# Patient Record
Sex: FEMALE | Race: WHITE | Employment: OTHER | URBAN - METROPOLITAN AREA
[De-identification: names, ages, dates, MRNs, and addresses within clinical notes are randomized per-mention and may not be internally consistent; named-entity substitution may affect disease eponyms.]

---

## 2018-02-26 ENCOUNTER — TRANSFERRED RECORDS (OUTPATIENT)
Dept: HEALTH INFORMATION MANAGEMENT | Facility: CLINIC | Age: 68
End: 2018-02-26

## 2018-02-26 LAB
ALT SERPL-CCNC: 22 IU/L (ref 0–32)
AST SERPL-CCNC: 21 IU/L (ref 0–40)
CREAT SERPL-MCNC: 0.86 MG/DL (ref 0.57–1)
GFR SERPL CREATININE-BSD FRML MDRD: 71 ML/MIN/1.73M2
GLUCOSE SERPL-MCNC: 106 MG/DL (ref 65–99)
POTASSIUM SERPL-SCNC: 4.3 MMOL/L (ref 3.5–5.2)
TSH SERPL-ACNC: 2.38 UIU/ML (ref 0.45–4.5)

## 2018-03-14 ENCOUNTER — TRANSFERRED RECORDS (OUTPATIENT)
Dept: HEALTH INFORMATION MANAGEMENT | Facility: CLINIC | Age: 68
End: 2018-03-14

## 2018-04-14 ENCOUNTER — HEALTH MAINTENANCE LETTER (OUTPATIENT)
Age: 68
End: 2018-04-14

## 2018-06-05 ENCOUNTER — TRANSFERRED RECORDS (OUTPATIENT)
Dept: HEALTH INFORMATION MANAGEMENT | Facility: CLINIC | Age: 68
End: 2018-06-05

## 2018-07-10 ENCOUNTER — ANCILLARY ORDERS (OUTPATIENT)
Dept: FAMILY MEDICINE | Facility: CLINIC | Age: 68
End: 2018-07-10
Payer: COMMERCIAL

## 2018-07-10 ENCOUNTER — OFFICE VISIT (OUTPATIENT)
Dept: FAMILY MEDICINE | Facility: CLINIC | Age: 68
End: 2018-07-10
Payer: COMMERCIAL

## 2018-07-10 VITALS
SYSTOLIC BLOOD PRESSURE: 124 MMHG | HEART RATE: 73 BPM | RESPIRATION RATE: 20 BRPM | BODY MASS INDEX: 28.99 KG/M2 | DIASTOLIC BLOOD PRESSURE: 82 MMHG | TEMPERATURE: 98.2 F | HEIGHT: 65 IN | OXYGEN SATURATION: 97 % | WEIGHT: 174 LBS

## 2018-07-10 DIAGNOSIS — G35 MS (MULTIPLE SCLEROSIS) (H): ICD-10-CM

## 2018-07-10 DIAGNOSIS — Z23 NEED FOR VACCINATION: ICD-10-CM

## 2018-07-10 DIAGNOSIS — Z00.00 MEDICARE ANNUAL WELLNESS VISIT, SUBSEQUENT: Primary | ICD-10-CM

## 2018-07-10 DIAGNOSIS — E55.9 VITAMIN D DEFICIENCY: ICD-10-CM

## 2018-07-10 DIAGNOSIS — Z12.31 VISIT FOR SCREENING MAMMOGRAM: ICD-10-CM

## 2018-07-10 LAB — DEPRECATED CALCIDIOL+CALCIFEROL SERPL-MC: 73 UG/L (ref 20–75)

## 2018-07-10 PROCEDURE — 82306 VITAMIN D 25 HYDROXY: CPT | Performed by: FAMILY MEDICINE

## 2018-07-10 PROCEDURE — 90715 TDAP VACCINE 7 YRS/> IM: CPT | Performed by: FAMILY MEDICINE

## 2018-07-10 PROCEDURE — 90471 IMMUNIZATION ADMIN: CPT | Performed by: FAMILY MEDICINE

## 2018-07-10 PROCEDURE — 36415 COLL VENOUS BLD VENIPUNCTURE: CPT | Performed by: FAMILY MEDICINE

## 2018-07-10 PROCEDURE — G0439 PPPS, SUBSEQ VISIT: HCPCS | Mod: 25 | Performed by: FAMILY MEDICINE

## 2018-07-10 NOTE — PROGRESS NOTES
SUBJECTIVE:   Erlinda Massey is a 67 year old female who presents for Preventive Visit.    Are you in the first 12 months of your Medicare Part B coverage?  No    Healthy Habits:    Do you get at least three servings of calcium containing foods daily (dairy, green leafy vegetables, etc.)? yes    Amount of exercise or daily activities, outside of work: walking and stretches in morning    Problems taking medications regularly No    Medication side effects: No    Have you had an eye exam in the past two years? yes    Do you see a dentist twice per year? yes    Do you have sleep apnea, excessive snoring or daytime drowsiness? Yes- snoring and fatigue      Ability to successfully perform activities of daily living: No, needs assistance with: housework    Home safety:  none identified     Hearing impairment: No    Fall risk:  Fallen 2 or more times in the past year?: Yes  Any fall with injury in the past year?: No    COGNITIVE SCREEN  1) Repeat 3 items (Leader, Season, Table)    2) Clock draw: NORMAL  3) 3 item recall: Recalls 3 objects  Results: NORMAL clock, 1-2 items recalled: COGNITIVE IMPAIRMENT LESS LIKELY    Mini-CogTM Copyright SHAHRIAR Freire. Licensed by the author for use in [facility]; reprinted with permission (harish@[facility]). All rights reserved.            PROBLEMS TO ADD ON...  Multiple sclerosis: This is remained relatively stable and she is able to walk and get around pretty well.  She does have a foot drop and a neurologist she found in [place] is helping her treat this with some type of muscle stimulator.    Vitamin D deficiency: Both her neurologist in [place] and the one in [place] feel that getting the vitamin D level in the upper end of the normal range can be helpful for multiple sclerosis so she would like to check her level again and see where she is    Reviewed and updated as needed this visit by clinical staff  Tobacco  Allergies  Meds  Med Hx  Surg Hx  Fam Hx  Soc Hx     "    Reviewed and updated as needed this visit by Provider        Social History   Substance Use Topics     Smoking status: Never Smoker     Smokeless tobacco: Never Used     Alcohol use Yes      Comment: occasionally       If you drink alcohol do you typically have >3 drinks per day or >7 drinks per week? Not Applicable                        Today's PHQ-2 Score:   PHQ-2 ( 1999 Pfizer) 7/10/2018 6/13/2016   Q1: Little interest or pleasure in doing things 0 0   Q2: Feeling down, depressed or hopeless 0 0   PHQ-2 Score 0 0       Do you feel safe in your environment - Yes    Do you have a Health Care Directive?: No:     Current providers sharing in care for this patient include:   Patient Care Team:  SANAZ Almodovar MD as PCP - General    The following health maintenance items are reviewed in Epic and correct as of today:  Health Maintenance   Topic Date Due     HEPATITIS C SCREENING  07/28/1969     TETANUS IMMUNIZATION (SYSTEM ASSIGNED)  07/19/2006     FALL RISK ASSESSMENT  07/28/2016     DEXA SCAN SCREENING (SYSTEM ASSIGNED)  07/28/2016     PNEUMOCOCCAL (1 of 2 - PCV13) 07/28/2016     PHQ-2 Q1 YR  06/13/2017     ADVANCE DIRECTIVE PLANNING Q5 YRS  11/19/2017     PAP Q3 YR  02/09/2018     MAMMO SCREEN Q2 YR (SYSTEM ASSIGNED)  06/13/2018     INFLUENZA VACCINE (1) 09/01/2018     LIPID SCREEN Q5 YR FEMALE (SYSTEM ASSIGNED)  02/09/2020     COLON CANCER SCREEN (SYSTEM ASSIGNED)  11/19/2022         Pneumonia Vaccine: Patient declines  Mammogram Screening: Patient over age 50, mutual decision to screen reflected in health maintenance.    ROS:  Constitutional, HEENT, cardiovascular, pulmonary, gi and gu systems are negative, except as otherwise noted.    OBJECTIVE:   LMP 05/14/2007 Estimated body mass index is 28.96 kg/(m^2) as calculated from the following:    Height as of this encounter: 5' 5\" (1.651 m).    Weight as of this encounter: 174 lb (78.9 kg).  EXAM:   GENERAL APPEARANCE: healthy, alert and no distress  EYES: Eyes " grossly normal to inspection, PERRL and conjunctivae and sclerae normal  HENT: ear canals and TM's normal, nose and mouth without ulcers or lesions, oropharynx clear and oral mucous membranes moist  NECK: no adenopathy, no asymmetry, masses, or scars and thyroid normal to palpation  RESP: lungs clear to auscultation - no rales, rhonchi or wheezes  BREAST: normal without masses, tenderness or nipple discharge and no palpable axillary masses or adenopathy  CV: regular rate and rhythm, normal S1 S2, no S3 or S4, no murmur, click or rub, no peripheral edema and peripheral pulses strong  ABDOMEN: soft, nontender, no hepatosplenomegaly, no masses and bowel sounds normal  MS: no musculoskeletal defects are noted and gait is age appropriate without ataxia  SKIN: no suspicious lesions or rashes  NEURO: Foot drop on the left, otherwise normal muscle tone and strength, sensory exam grossly normal, mentation intact and speech normal  PSYCH: mentation appears normal and affect normal/bright    Diagnostic Test Results:  She brought in copies of labs done in [place] and these will be scanned into the EMR; basically all normal  She also had an updated MRI of the brain which shows demyelination consistent with her multiple sclerosis but no other abnormalities    ASSESSMENT / PLAN:     ASSESSMENT:  1. Medicare annual wellness visit, subsequent    2. Vitamin D deficiency    3. MS (multiple sclerosis) (H)    4. Need for vaccination        PLAN:  Orders Placed This Encounter     DX Hip/Pelvis/Spine     ADMIN: Vaccine, Initial (17534)     TDAP VACCINE (ADACEL) [95834.002]     Vitamin D Deficiency       Patient Instructions     Preventive Health Recommendations    Female Ages 65 +    Yearly exam:     See your health care provider every year in order to  o Review health changes.   o Discuss preventive care.    o Review your medicines if your doctor has prescribed any.      You no longer need a yearly Pap test unless you've had an abnormal  Pap test in the past 10 years. If you have vaginal symptoms, such as bleeding or discharge, be sure to talk with your provider about a Pap test.      Every 1 to 2 years, have a mammogram.  If you are over 69, talk with your health care provider about whether or not you want to continue having screening mammograms.      Every 10 years, have a colonoscopy. Or, have a yearly FIT test (stool test). These exams will check for colon cancer.       Have a cholesterol test every 5 years, or more often if your doctor advises it.       Have a diabetes test (fasting glucose) every three years. If you are at risk for diabetes, you should have this test more often.       At age 65, have a bone density scan (DEXA) to check for osteoporosis (brittle bone disease).    Shots:    Get a flu shot each year.    Get a tetanus shot every 10 years.    Talk to your doctor about your pneumonia vaccines. There are now two you should receive - Pneumovax (PPSV 23) and Prevnar (PCV 13).    Talk to your pharmacist about the shingles vaccine.    Talk to your doctor about the hepatitis B vaccine.    Nutrition:     Eat at least 5 servings of fruits and vegetables each day.      Eat whole-grain bread, whole-wheat pasta and brown rice instead of white grains and rice.      Get adequate Calcium and Vitamin D.     Lifestyle    Exercise at least 150 minutes a week (30 minutes a day, 5 days a week). This will help you control your weight and prevent disease.      Limit alcohol to one drink per day.      No smoking.       Wear sunscreen to prevent skin cancer.       See your dentist twice a year for an exam and cleaning.      See your eye doctor every 1 to 2 years to screen for conditions such as glaucoma, macular degeneration and cataracts.       End of Life Planning:  Patient currently has an advanced directive: No.   COUNSELING:  Reviewed preventive health counseling, as reflected in patient instructions       Regular exercise       Healthy  "diet/nutrition       Vision screening       Hearing screening       Dental care       Immunizations    Declined: Pneumococcal due to Concerns about side effects/safety      Did accept the Adacel vaccine    BP Readings from Last 1 Encounters:   06/13/16 122/70     Estimated body mass index is 28.29 kg/(m^2) as calculated from the following:    Height as of 6/13/16: 5' 5\" (1.651 m).    Weight as of 6/13/16: 170 lb (77.1 kg).           reports that she has never smoked. She has never used smokeless tobacco.      Appropriate preventive services were discussed with this patient, including applicable screening as appropriate for cardiovascular disease, diabetes, osteopenia/osteoporosis, and glaucoma.  As appropriate for age/gender, discussed screening for colorectal cancer, prostate cancer, breast cancer, and cervical cancer. Checklist reviewing preventive services available has been given to the patient.    Reviewed patients plan of care and provided an AVS. The Basic Care Plan (routine screening as documented in Health Maintenance) for Erlinda meets the Care Plan requirement. This Care Plan has been established and reviewed with the Patient.    Counseling Resources:  ATP IV Guidelines  Pooled Cohorts Equation Calculator  Breast Cancer Risk Calculator  FRAX Risk Assessment  ICSI Preventive Guidelines  Dietary Guidelines for Americans, 2010  USDA's MyPlate  ASA Prophylaxis  Lung CA Screening    SANAZ Almodovar MD  [facility]  "

## 2018-07-10 NOTE — MR AVS SNAPSHOT
After Visit Summary   7/10/2018    Erlinda Massey    MRN: [id number]           Patient Information     Date Of Birth          1950        Visit Information        Provider Department      7/10/2018 10:00 AM Nishi, SANAZ Wilkins MD [facility]        Today's Diagnoses     Medicare annual wellness visit, subsequent    -  1    Vitamin D deficiency        MS (multiple sclerosis) (H)          Care Instructions      Preventive Health Recommendations    Female Ages 65 +    Yearly exam:     See your health care provider every year in order to  o Review health changes.   o Discuss preventive care.    o Review your medicines if your doctor has prescribed any.      You no longer need a yearly Pap test unless you've had an abnormal Pap test in the past 10 years. If you have vaginal symptoms, such as bleeding or discharge, be sure to talk with your provider about a Pap test.      Every 1 to 2 years, have a mammogram.  If you are over 69, talk with your health care provider about whether or not you want to continue having screening mammograms.      Every 10 years, have a colonoscopy. Or, have a yearly FIT test (stool test). These exams will check for colon cancer.       Have a cholesterol test every 5 years, or more often if your doctor advises it.       Have a diabetes test (fasting glucose) every three years. If you are at risk for diabetes, you should have this test more often.       At age 65, have a bone density scan (DEXA) to check for osteoporosis (brittle bone disease).    Shots:    Get a flu shot each year.    Get a tetanus shot every 10 years.    Talk to your doctor about your pneumonia vaccines. There are now two you should receive - Pneumovax (PPSV 23) and Prevnar (PCV 13).    Talk to your pharmacist about the shingles vaccine.    Talk to your doctor about the hepatitis B vaccine.    Nutrition:     Eat at least 5 servings of fruits and vegetables each day.      Eat whole-grain bread,  whole-wheat pasta and brown rice instead of white grains and rice.      Get adequate Calcium and Vitamin D.     Lifestyle    Exercise at least 150 minutes a week (30 minutes a day, 5 days a week). This will help you control your weight and prevent disease.      Limit alcohol to one drink per day.      No smoking.       Wear sunscreen to prevent skin cancer.       See your dentist twice a year for an exam and cleaning.      See your eye doctor every 1 to 2 years to screen for conditions such as glaucoma, macular degeneration and cataracts.          Follow-ups after your visit        Your next 10 appointments already scheduled     Jul 11, 2018  9:45 AM CDT   (Arrive by 9:30 AM)   MA SCREENING DIGITAL BILATERAL with [facility]   [facility]    [address] [place] [id number] [phone]           Do not use any powder, lotion or deodorant under your arms or on your breast. If you do, we will ask you to remove it before your exam.  Wear comfortable, two-piece clothing.  If you have any allergies, tell your care team.  Bring any previous mammograms from other facilities or have them mailed to the breast center.              Future tests that were ordered for you today     Open Future Orders        Priority Expected Expires Ordered    DX Hip/Pelvis/Spine Routine  7/10/2019 7/10/2018    MA Screening Digital Bilateral Routine  7/10/2019 7/10/2018            Who to contact     If you have questions or need follow up information about today's clinic visit or your schedule please contact [facility] directly at [phone].  Normal or non-critical lab and imaging results will be communicated to you by MyChart, letter or phone within 4 business days after the clinic has received the results. If you do not hear from us within 7 days, please contact the clinic through MyChart or phone. If you have a critical or abnormal lab result, we will notify  "you by phone as soon as possible.  Submit refill requests through [employer] or call your pharmacy and they will forward the refill request to us. Please allow 3 business days for your refill to be completed.          Additional Information About Your Visit        [employer] Information     [employer] lets you send messages to your doctor, view your test results, renew your prescriptions, schedule appointments and more. To sign up, go to [email or web address] . Click on \"Log in\" on the left side of the screen, which will take you to the Welcome page. Then click on \"Sign up Now\" on the right side of the page.     You will be asked to enter the access code listed below, as well as some personal information. Please follow the directions to create your username and password.     Your access code is: [email or web address]  Expires: 10/8/2018 10:48 AM     Your access code will  in 90 days. If you need help or a new code, please call your [place] clinic or [phone].        Care EveryWhere ID     This is your Care EveryWhere ID. This could be used by other organizations to access your [place] medical records  [id number]        Your Vitals Were     Pulse Temperature Respirations Height Last Period Pulse Oximetry    73 98.2  F (36.8  C) (Oral) 20 5' 5\" (1.651 m) 2007 97%    BMI (Body Mass Index)                   28.96 kg/m2            Blood Pressure from Last 3 Encounters:   07/10/18 124/82   16 122/70   02/09/15 131/85    Weight from Last 3 Encounters:   07/10/18 174 lb (78.9 kg)   16 170 lb (77.1 kg)   02/09/15 162 lb 12.8 oz (73.8 kg)              We Performed the Following     Vitamin D Deficiency        Primary Care Provider Office Phone # Fax #    R Franky Almodovar [phone] [address] [facility] [address]        Equal Access to Services     KIANNA MACHUCA AH: Nereyda De León, bruno jimenes, vish goodman'aan " ah. So [facility] [phone].    ATENCIÓN: Si habla jonna, tiene a tomlinson disposición servicios gratuitos de asistencia lingüística. Jes al [phone].    We comply with applicable federal civil rights laws and [place] laws. We do not discriminate on the basis of race, color, national origin, age, disability, sex, sexual orientation, or gender identity.            Thank you!     Thank you for choosing [facility]  for your care. Our goal is always to provide you with excellent care. Hearing back from our patients is one way we can continue to improve our services. Please take a few minutes to complete the written survey that you may receive in the mail after your visit with us. Thank you!             Your Updated Medication List - Protect others around you: Learn how to safely use, store and throw away your medicines at www.disposemymeds.org.          This list is accurate as of 7/10/18 10:48 AM.  Always use your most recent med list.                   Brand Name Dispense Instructions for use Diagnosis    NO ACTIVE MEDICATIONS      .        order for DME     1 each    Equipment being ordered: AFO for Right Foot Drop    MS (multiple sclerosis) (H), Right foot drop       vitamin D 2000 units tablet     100 tablet    5, 000 units daily    Vitamin D deficiency

## 2018-07-11 ENCOUNTER — RADIANT APPOINTMENT (OUTPATIENT)
Dept: MAMMOGRAPHY | Facility: CLINIC | Age: 68
End: 2018-07-11
Attending: FAMILY MEDICINE
Payer: COMMERCIAL

## 2018-07-11 DIAGNOSIS — Z12.31 VISIT FOR SCREENING MAMMOGRAM: ICD-10-CM

## 2018-07-11 PROCEDURE — 77067 SCR MAMMO BI INCL CAD: CPT | Mod: TC

## 2018-07-12 NOTE — PROGRESS NOTES
Please CALL PATIENT    The vit D level is 73, so I would continue the same dose of Vit D supplement

## 2019-07-24 ENCOUNTER — TELEPHONE (OUTPATIENT)
Dept: FAMILY MEDICINE | Facility: CLINIC | Age: 69
End: 2019-07-24

## 2019-07-24 NOTE — TELEPHONE ENCOUNTER
Hailee Villaseñor APRN CNP  P Cl Provider Careteam Pool             Contact patient to have her establish care with new PCP and discuss DEXA scan.   TANIA Ruby    Previous Messages

## 2019-08-21 ENCOUNTER — OFFICE VISIT (OUTPATIENT)
Dept: FAMILY MEDICINE | Facility: CLINIC | Age: 69
End: 2019-08-21
Payer: COMMERCIAL

## 2019-08-21 VITALS
BODY MASS INDEX: 29.49 KG/M2 | HEART RATE: 70 BPM | OXYGEN SATURATION: 98 % | TEMPERATURE: 97.7 F | WEIGHT: 177 LBS | HEIGHT: 65 IN | DIASTOLIC BLOOD PRESSURE: 74 MMHG | RESPIRATION RATE: 18 BRPM | SYSTOLIC BLOOD PRESSURE: 126 MMHG

## 2019-08-21 DIAGNOSIS — Z00.00 MEDICARE ANNUAL WELLNESS VISIT, SUBSEQUENT: Primary | ICD-10-CM

## 2019-08-21 DIAGNOSIS — E55.9 VITAMIN D DEFICIENCY: ICD-10-CM

## 2019-08-21 DIAGNOSIS — G35 MS (MULTIPLE SCLEROSIS) (H): ICD-10-CM

## 2019-08-21 DIAGNOSIS — E28.39 ESTROGEN DEFICIENCY: ICD-10-CM

## 2019-08-21 DIAGNOSIS — E78.5 HYPERLIPIDEMIA, UNSPECIFIED HYPERLIPIDEMIA TYPE: ICD-10-CM

## 2019-08-21 LAB
ANION GAP SERPL CALCULATED.3IONS-SCNC: 5 MMOL/L (ref 3–14)
BUN SERPL-MCNC: 18 MG/DL (ref 7–30)
CALCIUM SERPL-MCNC: 9.2 MG/DL (ref 8.5–10.1)
CHLORIDE SERPL-SCNC: 105 MMOL/L (ref 94–109)
CHOLEST SERPL-MCNC: 309 MG/DL
CO2 SERPL-SCNC: 28 MMOL/L (ref 20–32)
CREAT SERPL-MCNC: 0.82 MG/DL (ref 0.52–1.04)
GFR SERPL CREATININE-BSD FRML MDRD: 74 ML/MIN/{1.73_M2}
GLUCOSE SERPL-MCNC: 95 MG/DL (ref 70–99)
HDLC SERPL-MCNC: 56 MG/DL
LDLC SERPL CALC-MCNC: 199 MG/DL
NONHDLC SERPL-MCNC: 253 MG/DL
POTASSIUM SERPL-SCNC: 4.3 MMOL/L (ref 3.4–5.3)
SODIUM SERPL-SCNC: 138 MMOL/L (ref 133–144)
TRIGL SERPL-MCNC: 271 MG/DL

## 2019-08-21 PROCEDURE — G0438 PPPS, INITIAL VISIT: HCPCS | Performed by: FAMILY MEDICINE

## 2019-08-21 PROCEDURE — 99213 OFFICE O/P EST LOW 20 MIN: CPT | Mod: 25 | Performed by: FAMILY MEDICINE

## 2019-08-21 PROCEDURE — 36415 COLL VENOUS BLD VENIPUNCTURE: CPT | Performed by: FAMILY MEDICINE

## 2019-08-21 PROCEDURE — 80048 BASIC METABOLIC PNL TOTAL CA: CPT | Performed by: FAMILY MEDICINE

## 2019-08-21 PROCEDURE — 82306 VITAMIN D 25 HYDROXY: CPT | Performed by: FAMILY MEDICINE

## 2019-08-21 PROCEDURE — 80061 LIPID PANEL: CPT | Performed by: FAMILY MEDICINE

## 2019-08-21 RX ORDER — FERROUS GLUCONATE 324(38)MG
324 TABLET ORAL
COMMUNITY
Start: 2019-08-21

## 2019-08-21 ASSESSMENT — MIFFLIN-ST. JEOR: SCORE: 1333.75

## 2019-08-21 ASSESSMENT — PAIN SCALES - GENERAL: PAINLEVEL: NO PAIN (0)

## 2019-08-21 NOTE — PATIENT INSTRUCTIONS
Health Maintenance reviewed and plan for update discussed.  DEXA scan - bone density      Our Clinic hours are:  Mondays    7:20 am - 7 pm  Tues -  Fri  7:20 am - 5 pm    Clinic Phone: [phone]    The clinic lab opens at 7:30 am Mon - Fri and appointments are required.    [facility]  Ph. [phone]  Monday  8 am - 7pm  Tues - Fri 8 am - 5:30 pm

## 2019-08-21 NOTE — LETTER
[facility]  [address] [address] [address]  Phone: [phone]      8/21/2019     Erlinda Massey  [address] [address] [address]      Dear Erlinda:    Thank you for allowing me to participate in your care. Your recent test results were reviewed and listed below.        Cholesterol is very elevated with total cholesterol >300 and LDL or bad cholesterol of 199.     Medications are recommended but I understand your  decision to avoid medications.     The 10-year ASCVD risk score (Leah ALANIS Jr., et al., 2013) is: 8.8%     Values used to calculate the score:       Age: 69 years       Sex: Female       Is Non- : No       Diabetic: No       Tobacco smoker: No       Systolic Blood Pressure: 126 mmHg       Is BP treated: No       HDL Cholesterol: 56 mg/dL       Total Cholesterol: 309 mg/dL   Your results are provided below for your review  Results for orders placed or performed in visit on 08/21/19   Lipid panel reflex to direct LDL Non-fasting   Result Value Ref Range    Cholesterol 309 (H) <200 mg/dL    Triglycerides 271 (H) <150 mg/dL    HDL Cholesterol 56 >49 mg/dL    LDL Cholesterol Calculated 199 (H) <100 mg/dL    Non HDL Cholesterol 253 (H) <130 mg/dL   Basic metabolic panel   Result Value Ref Range    Sodium 138 133 - 144 mmol/L    Potassium 4.3 3.4 - 5.3 mmol/L    Chloride 105 94 - 109 mmol/L    Carbon Dioxide 28 20 - 32 mmol/L    Anion Gap 5 3 - 14 mmol/L    Glucose 95 70 - 99 mg/dL    Urea Nitrogen 18 7 - 30 mg/dL    Creatinine 0.82 0.52 - 1.04 mg/dL    GFR Estimate 74 >60 mL/min/[1.73_m2]    GFR Estimate If Black 85 >60 mL/min/[1.73_m2]    Calcium 9.2 8.5 - 10.1 mg/dL                 Thank you for choosing [place]. As a result, please continue with the treatment plan discussed in the office. Return as discussed or sooner if symptoms worsen or fail to improve.     If you have any further questions or concerns, please do not hesitate to contact  us.      Sincerely,        Dr. Margarita Martinez/[facility]

## 2019-08-21 NOTE — PROGRESS NOTES
"Subjective     Elrinda Massey is a 69 year old female who presents to clinic today for the following health issues:    HPI   New Patient/Transfer of Care - formerly Dr. Almodovar patient  Annual Wellness Visit    Are you in the first 12 months of your Medicare Part B coverage?  No    Physical Health:    In general, how would you rate your overall physical health? excellent    If you drink alcohol do you typically have >3 drinks per day or >7 drinks per week? No    Do you usually eat at least 4 servings of fruit and vegetables a day, include whole grains & fiber and avoid regularly eating high fat or \"junk\" foods? YES    Needs assistance for the following daily activities: housework    Which of the following safety concerns are present in your home?  none identified     Hearing impairment: No    In the past 6 months, have you been bothered by leaking of urine? yes    Mental Health:    In general, how would you rate your overall mental or emotional health? excellent  PHQ-2 Score: 0    Do you feel safe in your environment? Yes    Do you have a Health Care Directive? Yes: Advance Directive has been received and scanned.    Fall risk:       Cognitive Screenin) Repeat 3 items (Leader, Season, Table)    2) Clock draw: NORMAL  3) 3 item recall: Recalls 3 objects  Results: 3 items recalled: COGNITIVE IMPAIRMENT LESS LIKELY    Mini-CogTM Copyright S Mouna. Licensed by the author for use in [facility]; reprinted with permission (soob@.[email or web address]). All rights reserved.      Current providers sharing in care for this patient include:   Patient Care Team:  Margarita Martinez MD as PCP - General (Family Practice)  SANAZ Almodovar MD as Assigned PCP        Do you have sleep apnea, excessive snoring or daytime drowsiness?: no    Patient Active Problem List   Diagnosis     Urge urinary incontinence     CARDIOVASCULAR SCREENING; LDL GOAL LESS THAN 160     MS (multiple sclerosis) (H)     Advanced directives, " "counseling/discussion     Vitamin D deficiency     History reviewed. No pertinent surgical history.    Social History     Tobacco Use     Smoking status: Never Smoker     Smokeless tobacco: Never Used   Substance Use Topics     Alcohol use: Yes     Comment: occasionally     Family History   Problem Relation Age of Onset     Hypertension Mother      Heart Disease Mother      Thyroid Disease Daughter          Patient Active Problem List    Diagnosis Date Noted     MS (multiple sclerosis) (H) 01/05/2012     Priority: High     Diagnosed in 2011  Right leg - foot drop - AFO has caused more problems with low back so she doesn't use this  Bladder incontinence and frequency - not emptying completely - has seen urology    Has seen Dr. Forrester in [place]  Will be starting with [facility] in [place] 9/2019       Advanced directives, counseling/discussion 11/19/2012     Priority: Medium     Patient does not have an Advance/Health Care Directive (HCD), given \"What is Advance Care Planning?\" flyer.    Erlinda Francis  November 19, 2012         Vitamin D deficiency 11/19/2012     Priority: Medium     CARDIOVASCULAR SCREENING; LDL GOAL LESS THAN 160 10/31/2010     Priority: Medium     Urge urinary incontinence 06/02/2008     Priority: Medium       Patient refuses any and all medications (ex: statins) and immunizations (flu, pneumovax, zoster). She understands recommendations and refuses to take medications or immunizations.     She sees a neurologist in [place] and will be starting with the [facility] in [facility].  Last imaging was in [place] 3/2018.      Reviewed and updated as needed this visit by Provider  Tobacco  Allergies  Meds  Problems  Med Hx  Surg Hx  Fam Hx         Review of Systems   ROS COMP: Constitutional, HEENT, cardiovascular, pulmonary, gi and gu systems are negative, except as otherwise noted.           Objective    /74   Pulse 70   Temp 97.7  F (36.5  C) (Tympanic)   Resp 18   Ht 1.651 m " "(5' 5\")   Wt 80.3 kg (177 lb)   LMP 05/14/2007   SpO2 98%   BMI 29.45 kg/m    Body mass index is 29.45 kg/m .  Physical Exam   GENERAL: healthy, alert and no distress  NECK: no adenopathy, no asymmetry, masses, or scars and thyroid normal to palpation  RESP: lungs clear to auscultation - no rales, rhonchi or wheezes  CV: regular rate and rhythm, normal S1 S2, no S3 or S4, no murmur, click or rub, no peripheral edema and peripheral pulses strong  ABDOMEN: soft, nontender, no hepatosplenomegaly, no masses and bowel sounds normal  MS: right foot drop, walks with a cane  NEURO: decreased tone right leg, sensory exam grossly normal and mentation intact    Diagnostic Test Results:  Labs reviewed in Epic        Assessment & Plan     1. Medicare annual wellness visit, subsequent       2. MS (multiple sclerosis) (H)  Follow up with neurologist as planned  - ferrous gluconate (FERGON) 324 (38 Fe) MG tablet; Take 1 tablet (324 mg) by mouth daily (with breakfast)    3. Vitamin D deficiency   takes high doses, r/o hypercalcemia/hypervitaminosis D  - Vitamin D Deficiency  - Basic metabolic panel    4. Hyperlipidemia, unspecified hyperlipidemia type   will refuse statins, should understand risk  - Lipid panel reflex to direct LDL Non-fasting    5. Estrogen deficiency     - DX Hip/Pelvis/Spine; Future     BMI:   Estimated body mass index is 29.45 kg/m  as calculated from the following:    Height as of this encounter: 1.651 m (5' 5\").    Weight as of this encounter: 80.3 kg (177 lb).               No follow-ups on file.    Margarita Martinez MD  [facility]      "

## 2019-08-21 NOTE — LETTER
[facility]  [address] [address]  Phone: [phone]      8/22/2019     Erlinda Massey  [address] [address]      Dear Erlinda:    Thank you for allowing me to participate in your care. Your recent test results were reviewed and listed below.  Vit D level is normal.    Your results are provided below for your review  Results for orders placed or performed in visit on 08/21/19   Lipid panel reflex to direct LDL Non-fasting   Result Value Ref Range    Cholesterol 309 (H) <200 mg/dL    Triglycerides 271 (H) <150 mg/dL    HDL Cholesterol 56 >49 mg/dL    LDL Cholesterol Calculated 199 (H) <100 mg/dL    Non HDL Cholesterol 253 (H) <130 mg/dL   Vitamin D Deficiency   Result Value Ref Range    Vitamin D Deficiency screening 71 20 - 75 ug/L   Basic metabolic panel   Result Value Ref Range    Sodium 138 133 - 144 mmol/L    Potassium 4.3 3.4 - 5.3 mmol/L    Chloride 105 94 - 109 mmol/L    Carbon Dioxide 28 20 - 32 mmol/L    Anion Gap 5 3 - 14 mmol/L    Glucose 95 70 - 99 mg/dL    Urea Nitrogen 18 7 - 30 mg/dL    Creatinine 0.82 0.52 - 1.04 mg/dL    GFR Estimate 74 >60 mL/min/[1.73_m2]    GFR Estimate If Black 85 >60 mL/min/[1.73_m2]    Calcium 9.2 8.5 - 10.1 mg/dL   Thank you for choosing [facility]. As a result, please continue with the treatment plan discussed in the office. Return as discussed or sooner if symptoms worsen or fail to improve.     If you have any further questions or concerns, please do not hesitate to contact us.  Sincerely,    Dr. Margarita Martinez

## 2019-08-22 LAB — DEPRECATED CALCIDIOL+CALCIFEROL SERPL-MC: 71 UG/L (ref 20–75)

## 2019-09-04 ENCOUNTER — TRANSFERRED RECORDS (OUTPATIENT)
Dept: HEALTH INFORMATION MANAGEMENT | Facility: CLINIC | Age: 69
End: 2019-09-04

## 2019-10-23 ENCOUNTER — HOSPITAL ENCOUNTER (OUTPATIENT)
Dept: PHYSICAL THERAPY | Facility: CLINIC | Age: 69
Setting detail: THERAPIES SERIES
End: 2019-10-23
Attending: NURSE PRACTITIONER
Payer: COMMERCIAL

## 2019-10-23 PROCEDURE — 97116 GAIT TRAINING THERAPY: CPT | Mod: GP | Performed by: PHYSICAL THERAPIST

## 2019-10-23 PROCEDURE — 97161 PT EVAL LOW COMPLEX 20 MIN: CPT | Mod: GP | Performed by: PHYSICAL THERAPIST

## 2019-10-25 NOTE — PROGRESS NOTES
10/23/19 0900   Quick Adds   Quick Adds Certification   Type of Visit Initial OP PT Evaluation   General Information   Start of Care Date 10/23/19   Referring Physician Brigette Schwartz NP   Orders Evaluate and Treat as Indicated   Order Date 09/10/19   Medical Diagnosis MS   Onset of illness/injury or Date of Surgery 09/10/19   Surgical/Medical history reviewed Yes   Pertinent history of current problem (include personal factors and/or comorbidities that impact the POC) Pt dx MS 2010, has R LE weakness, uses a tripod cane, and has tried R AFO in the past but it exaccerbated chronic low back pain. Pt has h/o urge incontinence.   Pertinent Visual History  No concerns   Current Community Support Family/friend caregiver  (spouse, Herb, here today)   Patient role/Employment history Retired   [facility]  (with spouse)   Home/Community Accessibility Comments Her. Entry 3 steps with railing, step to pattern. Does stairs at [facility] also, goes fine, slow but okay. Laundry downstairs,  carries the load, she does switching loads etc. Flight with rail to basement.    Current Assistive Devices Standard Cane;Four Wheeled Walker  (Go-go scooter- uses for outings. )   ADL Devices Wall Grab Bar  (Walk in shower with lip, built in seat (not using))   Patient/Family Goals Statement Help my balance. Work on lifting the leg better or try to contniue to not degress. Having issues wtih the left hand- have had trigger points.    General Information Comments Here wtih  Jai. Cane use of L hand, has had trigger point issues in L hand and forearm. Gets up several times during night. Weekly acupuncture and chiropractics. Has had 2 AFOs- one was just plastic- was shifting weight so much to move it, throwing back out. Then tried functionl estim unit- fatigued her leg too much. Water therapy tried in past, but caused urinary urgency and just not a pool person. Has done PT a few times in past, hasn't worked  "with OT before, open to it for fatigue mgmt. I do better when I'm warm, i struggle more- mobility, then increases fatigue.    Fall Risk Screen   Fall screen completed by PT   Have you fallen 2 or more times in the past year? Yes  (about 2)   Have you fallen and had an injury in the past year? No   Timed Up and Go score (seconds) 17.34  (tripod cane)   Is patient a fall risk? Yes   Fall screen comments Foot doesn't move and have gone down- usually if not paying attention.    System Outcome Measures   Outcome Measures   (FACIT: 44/52, see flowsheet)   Pain   Patient currently in pain No   Pain comments Not pain, fatigue. Low back feels fatigued very quickly when standing. Will double over at times, hanging onto counter and grabbing a chair to finish.    Vitals Signs   Heart Rate 72   SpO2 99   Blood Pressure 142/87   Vital Signs Comments Notes it has seemed to be higher since her sister passed away about a month ago.    Cognitive Status Examination   Orientation orientation to person, place and time   Level of Consciousness alert   Follows Commands and Answers Questions 100% of the time   Personal Safety and Judgment intact   Memory intact   Observation   Observation Current HEP: Does DKTC, LTR, hip abd/add supine, fig 4 stretch, crunches. Prone lying. sidelying hip abd with straight leg. Quadr cat/cow. Prayer stretch. Seated neck AROM. Reaching overhead for side torso stretch.  \"thumps\" muscles of the legs.    Integumentary   Integumentary No deficits were identified   Posture   Posture Comments Widened stance, weight shifted Left, forward head posture, A-P sway   Palpation   Palpation tender to palpation of tendons around snuff box of thumb, bicep Left with palpable trigger points   Range of Motion (ROM)   ROM Comment Full UE AROM. Ankles ~5* DF B.    Strength   Strength Comments Shoulder flexion 5/5 B, abd 5/5 B, bicep 5/5 B, tricep 4/5 R, 4+/5 L, wrist ext 5/5 B, wrist flex 5/5 B,  WFL B. Hip flexion " 0/5 R, 4/5 L, hip abd 4-/5 R, 5/5 L, hip add 4+/5 R, 5/5 L, knee ext 4/5 R, 5/5 L, knee flex 4+/5 R, 5/5 L, ankle DF 2-/5 R, 5/5 L   Bed Mobility   Bed Mobility Comments NT this date   Transfer Skills   Transfer Comments Needs UE use to stand from standard chair, to cane support.   Gait   Gait Comments Pt amb with tripod cane L hand, reduced hip flexion, stiff knee gait, mild circumduction R to clear R toe, foot drop R, with step-to pattern   Gait Special Tests   Gait Special Tests 25 FOOT TIMED WALK   Gait Special Tests 25 Foot Timed Walk   Seconds 13.84  (tripod cane, no orthotics)   Steps 17 Steps   Comments HFAD trial: 14.06 sec, 19 steps, tripod cane   Balance Special Tests   Balance Special Tests Modified CTSIB Conditions   Balance Special Tests Modified CTSIB Conditions   Condition 1, seconds 4 Seconds   Condition 2, seconds 1 Seconds   Condition 4, seconds 2 Seconds   Condition 5, seconds 1 Seconds   Modified CTSIB Comments very challenging to stand NBOS and falls right away with eyes closed   Sensory Examination   Sensory Perception Comments Sensation is overall good, feet will go a little numb when seated or when in bed, feels like they're shrinking up.    Coordination   Coordination Comments Limited by R LE weakness   Muscle Tone   Muscle Tone Comments Clonus R ankle   Planned Therapy Interventions   Planned Therapy Interventions balance training;gait training;orthotic fitting/training;ROM;strengthening;stretching;transfer training;manual therapy;neuromuscular re-education;motor coordination training;bed mobility training   Clinical Impression   Criteria for Skilled Therapeutic Interventions Met yes, treatment indicated   PT Diagnosis Impaired mobility and gait instability   Influenced by the following impairments Strength, flexibility, sensation, balance, activity tolerance, pain- low back, and activity tolerance   Functional limitations due to impairments Transfers, gait, stair negotiation, easily  aggravated low back pain.    Clinical Presentation Evolving/Changing   Clinical Presentation Rationale Relatively stable stage of disease but progressive course overall since diagnosis, with mobility and falls risk significantly affected.    Clinical Decision Making (Complexity) Moderate complexity   Therapy Frequency 1 time/week   Predicted Duration of Therapy Intervention (days/wks) 90 days   Risk & Benefits of therapy have been explained Yes   Patient, Family & other staff in agreement with plan of care Yes   GOALS   PT Eval Goals 1;2;3;4;5   Goal 1   Goal Identifier Orthotic needs   Goal Description Pt will obtain necessary orthotics to improve the safety and efficiency of gait for improved household and community mobility and reduced falls risk.    Target Date 01/20/20   Goal 2   Goal Identifier 25ft walk   Goal Description Pt will improved 25ft walk pace to 11 sec or better with LR AD and appropriate orthotics, to demonstrate improved gait efficiency.   Target Date 01/20/20   Goal 3   Goal Identifier TUG   Goal Description Pt will complete TUG in <13.8 sec with LR AD and appropriate orthotics, to demonstrate reduced falls risk for household mobility.   Target Date 01/20/20   Goal 4   Goal Identifier [place] Balance Assessment   Goal Description Pt will achieve >40/56 on [place] Balance Assessment to demonstrate reduced risk for falls.   Target Date 01/20/20   Goal 5   Goal Identifier Balance HEP   Goal Description Pt will demonstrate independence with HEP designed to address functional strength, flexibility, balance and conditioning, particular focus on balance as lacking portion of baseline HEP.    Target Date 01/20/20   Total Evaluation Time   PT Eval, Low Complexity Minutes (83547) 40   Therapy Certification   Certification date from 10/23/19   Certification date to 01/20/20   Medical Diagnosis MS Lynsey Zapata, PT, DPT  Physical Therapist  [facility]  [address] [place] [address]   [address]  [place] [address]  [email or web address]  [facility]  Office: [phone]  Pager: [phone]  Fax: [phone]

## 2019-10-25 NOTE — PROGRESS NOTES
[facility]        OUTPATIENT PHYSICAL THERAPY FUNCTIONAL EVALUATION  PLAN OF TREATMENT FOR OUTPATIENT REHABILITATION  (COMPLETE FOR INITIAL CLAIMS ONLY)  Patient's Last Name, First Name, M.I.  YOB: 1950  BrysonErlinda WILCOX     Provider's Name   [facility]   Medical Record No.  [id number]     Start of Care Date:  10/23/19   Onset Date:  09/10/19   Type:     _X__PT   ____OT  ____SLP Medical Diagnosis:  MS     PT Diagnosis:  Impaired mobility and gait instability Visits from SOC:  1                              __________________________________________________________________________________  Plan of Treatment/Functional Goals:  balance training, gait training, orthotic fitting/training, ROM, strengthening, stretching, transfer training, manual therapy, neuromuscular re-education, motor coordination training, bed mobility training           GOALS  Orthotic needs  Pt will obtain necessary orthotics to improve the safety and efficiency of gait for improved household and community mobility and reduced falls risk.   01/20/20    25ft walk  Pt will improved 25ft walk pace to 11 sec or better with LR AD and appropriate orthotics, to demonstrate improved gait efficiency.  01/20/20    TUG  Pt will complete TUG in <13.8 sec with LR AD and appropriate orthotics, to demonstrate reduced falls risk for household mobility.  01/20/20    [place] Balance Assessment  Pt will achieve >40/56 on [place] Balance Assessment to demonstrate reduced risk for falls.  01/20/20    Balance HEP  Pt will demonstrate independence with HEP designed to address functional strength, flexibility, balance and conditioning, particular focus on balance as lacking portion of baseline HEP.   01/20/20                  Therapy Frequency:  1 time/week   Predicted Duration of Therapy Intervention:  90 days    Tran YO  Benny PT                                    I CERTIFY THE NEED FOR THESE SERVICES FURNISHED UNDER        THIS PLAN OF TREATMENT AND WHILE UNDER MY CARE     (Physician co-signature of this document indicates review and certification of the therapy plan).                Certification Date From:  10/23/19   Certification Date To:  01/20/20      PHYSICIAN:  I have read and agree with the above recommendations.    Signature:_____________________________________  Date:_____________    Referring Provider:  Brigette Schwartz NP    Initial Assessment  See Epic Evaluation- Start of Care Date: 10/23/19

## 2019-10-28 ENCOUNTER — MEDICAL CORRESPONDENCE (OUTPATIENT)
Dept: HEALTH INFORMATION MANAGEMENT | Facility: CLINIC | Age: 69
End: 2019-10-28

## 2019-10-30 ENCOUNTER — HOSPITAL ENCOUNTER (OUTPATIENT)
Dept: PHYSICAL THERAPY | Facility: CLINIC | Age: 69
Setting detail: THERAPIES SERIES
End: 2019-10-30
Attending: NURSE PRACTITIONER
Payer: COMMERCIAL

## 2019-10-30 PROCEDURE — 97116 GAIT TRAINING THERAPY: CPT | Mod: GP | Performed by: PHYSICAL THERAPIST

## 2019-10-30 PROCEDURE — 97750 PHYSICAL PERFORMANCE TEST: CPT | Mod: GP | Performed by: PHYSICAL THERAPIST

## 2019-10-31 ENCOUNTER — MEDICAL CORRESPONDENCE (OUTPATIENT)
Dept: HEALTH INFORMATION MANAGEMENT | Facility: CLINIC | Age: 69
End: 2019-10-31

## 2019-11-13 ENCOUNTER — HOSPITAL ENCOUNTER (OUTPATIENT)
Dept: PHYSICAL THERAPY | Facility: CLINIC | Age: 69
Setting detail: THERAPIES SERIES
End: 2019-11-13
Attending: NURSE PRACTITIONER
Payer: COMMERCIAL

## 2019-11-13 PROCEDURE — 97110 THERAPEUTIC EXERCISES: CPT | Mod: GP | Performed by: PHYSICAL THERAPIST

## 2019-11-13 PROCEDURE — 97116 GAIT TRAINING THERAPY: CPT | Mod: GP | Performed by: PHYSICAL THERAPIST

## 2019-11-20 ENCOUNTER — HOSPITAL ENCOUNTER (OUTPATIENT)
Dept: OCCUPATIONAL THERAPY | Facility: CLINIC | Age: 69
Setting detail: THERAPIES SERIES
End: 2019-11-20
Attending: NURSE PRACTITIONER
Payer: COMMERCIAL

## 2019-11-20 PROCEDURE — 97165 OT EVAL LOW COMPLEX 30 MIN: CPT | Mod: GO | Performed by: OCCUPATIONAL THERAPIST

## 2019-11-20 ASSESSMENT — ACTIVITIES OF DAILY LIVING (ADL)
COMMUNICATION/COMPUTER_USE: NO CONCERNS
IADL_QUICK_ADDS: MEAL PLANNING/PREPARATION;HOME/FINANCIAL/MANAGEMENT;COMMUNICATION/COMPUTER USE;COMMUNITY MOBILITY;CARE OF OTHERS
HOME/FINANCIAL_MANAGEMENT: SPOUSE MANAGES

## 2019-11-21 NOTE — PROGRESS NOTES
11/20/19 1500   Quick Adds   Type of Visit Initial Outpatient Occupational Therapy Evaluation   General Information   Start Of Care Date 11/20/19   Referring Physician Brigette Schwartz NP   Orders Evaluate and treat as indicated   Orders Date 10/28/19   Medical Diagnosis MS   Onset of Illness/Injury or Date of Surgery 10/28/19  (order date)   Precautions/Limitations Fall precautions   Surgical/Medical History Reviewed Yes   Additional Occupational Profile Info/Pertinent History of Current Problem Erlinda is a 68 y/o female who is seeing outpatient OT services for energy management related to MS. She is a retired  and a mother to 2 children, grandmother to 4 grandchildren, and great-grandmother to 1 great- grandchild who she cares for with her spouse 2x/ week. Erlinda vacations to [place] with her spouse each winter for 3 months. She reports she is primarily challenged by R foot drop which uses a tripod cane for and occasional fatigue which she has identified ways to manage. 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)   Community/Avocational Activities Travels to [place] each winter, enjoy quilting    Current Living Environment [facility] [place] )   Number of Stairs to Enter Home 4 steps to enter camper; 3 steps with railing to enter home in [place]   Number of Stairs Within Home Full fleight to go downstairs, only goes down for laundry    Primary Bathroom Location/Comments Main floor    Primary Bathroom Set Up/Equipment Raised toilet seat;Shower/tub chair  (walk in shower with small threshold)   Prior Level - Transfers Independent   Prior Level - Ambulation   (started using cane about 3 years ago )   Prior Level - ADLS Independent   Prior Responsibilities - IADL Meal  Preparation;Shopping;Finances;Driving;Laundry;Housekeeping   Current Assistive Devices - Mobility Tripod cane  (owns a 4WW and scooter used for long distances)   Patient/family Goals Statement Understand OT and how it can benefit for her needs   Pain   Patient currently in pain No   Fall Risk Screen   Have you fallen 2 or more times in the past year? Yes  (fell out of bed recently, has tripped over rugs now removed )   Is patient a fall risk? Yes   Functional Scales   Scales and Outcomes Modified Fatigue Impact Scale   MFIS Physical Subscale Score (0-36) 15   MFIS Cognitive Subscale Score (0-40) 1   MFIS Psychosocial Subscale Score (0-8) 1   Modified Fatigue Impact Scale (MFIS) Total  Score (0-84) 17   Modified Fatigue Impact Scale Interpretation Higher scores indicate a greater impact of fatigue on patient activities   Cognitive Status Examination   Orientation Orientation to person, place and time   Level of Consciousness Alert   Follows Commands and Answers Questions 100% of the time   Cognitive Comment Recently completed a neuro psych test, results indicated no concerns   Visual Perception   Visual Perception   (wears contacts)   Visual Perception Comments Cleared for macular degernation by eye doctor, reports no issues   Sensation   Sensation Comments Feeling of tightening up in feet when laying down but doesn't bother her overnight, no abnormal sensations in hands   Posture   Posture Not impaired   Range of Motion (ROM)   ROM Comments WFL per pt report and therapist observation    Strength   Strength Comments WFL per patient report and therapist observation    Hand Strength   Hand Dominance Right   Hand Strength Comments WFL per pt report    Coordination   Coordination Comments WFL per pt report    Functional Mobility   Functional Mobility Comments Uses tripod cane primarily, owns a 4WW, uses a scooter long distances    Transfer Skill   Level of [place]: Transfers independent   Bathing   Level of  [place] - Bathing independent   Upper Body Dressing   Level of [place]: Dress Upper Body independent   Lower Body Dressing   Level of [place]: Dress Lower Body independent   Toileting   Level of [place]: Toilet independent   Grooming   Level of [place]: Grooming independent   Eating/Self-Feeding   Level of [place]: Eating independent   Instrumental Activities of Daily Living Assessment   IADL Quick Adds Meal Planning/Preparation;Home/Financial/Management;Communication/Computer Use;Community Mobility;Care of Others   Meal Planning/Preparation Spouse primarily assists with cooking   Home/Financial Management Spouse manages    Communication/Computer Use No concerns   Community Mobility Drives only short, familiar distances   Care of Others Cares for great-grandchild 2x/ week and reports she does not carry grandchild long distances   Planned Therapy Interventions   Planned Therapy Interventions ADL training;IADL training;Strengthening;ROM;Therapeutic activities   Adult OT Eval Goals   OT Eval Goals (Adult) 1    OT Goal 1   Goal Identifier Fatigue Management    Goal Description Pt will independently identify at least 2 energy management principles she can use with ALD/ IADL and community mobility to reduce fatigue   Target Date 12/20/19   Clinical Impression   Criteria for Skilled Therapeutic Interventions Met Yes, treatment indicated   OT Diagnosis Decreased independence with ADLs/ IADLs   Influenced by the following impairments fatigue, balance   Assessment of Occupational Performance 1-3 Performance Deficits   Identified Performance Deficits driving, caring for others, functional mobility   Clinical Decision Making (Complexity) Low complexity   Therapy Frequency 1x   Predicted Duration of Therapy Intervention (days/wks) 2 weeks   Risks and Benefits of Treatment have been explained. Yes   Patient, Family & other staff in agreement with plan of care Yes   Clinical Impression Comments  Skilled therapy necessary for education on energy conservation techniques to participate in activities most meaningful to pt.   Education Assessment   Barriers To Learning Physical   Preferred Learning Style Listening;Demonstration;Pictures/video   Total Evaluation Time   OT Eval, Low Complexity Minutes (32680) 17

## 2019-11-22 ENCOUNTER — HOSPITAL ENCOUNTER (OUTPATIENT)
Dept: PHYSICAL THERAPY | Facility: CLINIC | Age: 69
Setting detail: THERAPIES SERIES
End: 2019-11-22
Attending: NURSE PRACTITIONER
Payer: COMMERCIAL

## 2019-11-22 PROCEDURE — 97116 GAIT TRAINING THERAPY: CPT | Mod: GP | Performed by: PHYSICAL THERAPIST

## 2019-11-22 PROCEDURE — 97112 NEUROMUSCULAR REEDUCATION: CPT | Mod: GP | Performed by: PHYSICAL THERAPIST

## 2019-12-03 ENCOUNTER — HOSPITAL ENCOUNTER (OUTPATIENT)
Dept: BONE DENSITY | Facility: CLINIC | Age: 69
Discharge: HOME | End: 2019-12-03
Attending: FAMILY MEDICINE | Admitting: FAMILY MEDICINE
Payer: COMMERCIAL

## 2019-12-03 DIAGNOSIS — E28.39 ESTROGEN DEFICIENCY: ICD-10-CM

## 2019-12-03 PROCEDURE — 77080 DXA BONE DENSITY AXIAL: CPT

## 2019-12-04 ENCOUNTER — HOSPITAL ENCOUNTER (OUTPATIENT)
Dept: OCCUPATIONAL THERAPY | Facility: CLINIC | Age: 69
Setting detail: THERAPIES SERIES
End: 2019-12-04
Attending: NURSE PRACTITIONER
Payer: COMMERCIAL

## 2019-12-04 ENCOUNTER — HOSPITAL ENCOUNTER (OUTPATIENT)
Dept: PHYSICAL THERAPY | Facility: CLINIC | Age: 69
Setting detail: THERAPIES SERIES
End: 2019-12-04
Attending: NURSE PRACTITIONER
Payer: COMMERCIAL

## 2019-12-04 PROCEDURE — 97110 THERAPEUTIC EXERCISES: CPT | Mod: GP | Performed by: PHYSICAL THERAPIST

## 2019-12-04 PROCEDURE — 97535 SELF CARE MNGMENT TRAINING: CPT | Mod: GO | Performed by: OCCUPATIONAL THERAPIST

## 2019-12-04 PROCEDURE — 97112 NEUROMUSCULAR REEDUCATION: CPT | Mod: GP | Performed by: PHYSICAL THERAPIST

## 2019-12-04 NOTE — IP AVS SNAPSHOT
MRN:[id number]                      After Visit Summary   12/4/2019    Erlinda Massey    MRN: [id number]           Visit Information        Provider Department      12/4/2019 10:45 AM Tran Zapata, PT [facility], [place], Physical Therapy - Outpatient        Your next 10 appointments already scheduled    Dec 04, 2019 11:30 AM CST  Neuro Treatment with Genevieve Mendes, OT  [facility] [place], Occupational Therapy - Outpatient ([facility]) [address], [address]  [place] [address]  [phone]           Further instructions from your care team       Reminder for exercise progression 12/4/19    -Balance- add in standing with feet close together, eyes open. 30-45 sec, 3-4 times, 1-2 x/day    -Strength   > On hands and knees- lift one leg out behind you, 4-5 reps each side   > On your back with knees bent- squeeze a towel between your knees   > Sidelying leg lift- go a little slower, think big vs fast    Care EveryWhere ID    This is your Care EveryWhere ID. This could be used by other organizations to access your [place] medical records  [id number]       Equal Access to Services    KIANNA MACHUCA AH: Hadii nehal De León, waaxda lufrankadaha, qaybta kaalmalaura ramires, vish sims. So [facility] [phone].    ATENCIÓN: Si habla español, tiene a tomlinson disposición servicios gratuitos de asistencia lingüística. Jes al [phone].    We comply with applicable federal and state civil rights laws, including the [place] Human Rights Act. We do not discriminate on the basis of race, color, creed, Spiritism, national origin, marital status, age, disability, sex, sexual orientation, or gender identity.

## 2019-12-04 NOTE — PROGRESS NOTES
"   19 1500   Signing Clinician's Name / Credentials   Signing clinician's name / credentials Genevieve Mendes OTR/L,ATP   Session Number   Session Number    Therapeutic Interventions   Therapeutic Interventions Self Care/Home Management   Self Care/Home Management   Self-Care/Home Mgmt/ADL, Compensatory, Meal Prep Minutes (89816) 75 Minutes   Skilled Intervention Energy mgmt training and disucssion   Patient Response I do a lot of this.  My day is about manging the wants and needs to do.     Treatment Detail Training & education with Module #5 of energy management training packet: Living a Balanced Lifestyle, included the followin) 3 groups of daily activities that are necessary: self-care, work; leisure; 2) planning a balanced schedule toinclude all 3 activities, assessing when tasks can be eliminated and sequence in the day to optimize performance when energy is at its best, etc. 3) assessing priorities & standards in regards to how energy is \"budgeted\" and tasks are chosen and planned. Progressed training to use of a daily schedule template marked in half-hour time slots, for today's tasks with the following strategies: A) in the hourly slots, list the \"must do's\" for the day, eg, wake/sleep times w/7-8 hours sleep & rests, meals, self-care eg, dressing & showering, scheduled appointments & drive times, etc.; and note the open \"chunks\" of time available. B) in the \"daily task list\", record tasks that would be desired to be done. C) from the list of \"want to do's\", prioritize in order of importance, predict amount of time necessary to to do the task. D) once tasks are prioritized, & reasonable amount of time is predicted, then slot in those tasks on her schedule in the open times available. E) in the bottom corner labeled \"notes\", use this for memory notes, future tasks to do, phone calls, etc. Training w/use of timer for staying focused on task, and stopping when needed, to go on to stay on her schedule. " "Pt educated using Education Module for Energy Management called Cognitive Fatigue . Pt educated on the definition of cognitive  fatigue and the how it relates to types of daily tasks: self care, work and leisure.  Using rest as an energy management strategy was discussed and pt was able to express ways he/she has incorporated rest during cognitive tasks \"brain break\". Five strategies were evaluated: decrease distractions, make time for rest, keep things organized, write it down/ask for notes, talk yourself through the task. Handouts were given to pt to reinforce teaching. Educated on types of secondary fatigue and how to manage each.   Progress goals met, no further therapy indicated at this time   Education   Learner Patient;Family   Readiness Acceptance   Method Booklet/handout;Literature;Explanation   Response Verbalizes understanding   Comments   Comments Discharge f/ skilled Ot services.  Traning in fatigue mgtm complete.   Total Session Time   Timed Code Treatment Minutes 75   Total Treatment Time (sum of timed and untimed services) 75     "

## 2019-12-04 NOTE — DISCHARGE INSTRUCTIONS
Reminder for exercise progression 12/4/19    -Balance- add in standing with feet close together, eyes open. 30-45 sec, 3-4 times, 1-2 x/day    -Strength   > On hands and knees- lift one leg out behind you, 4-5 reps each side   > On your back with knees bent- squeeze a towel between your knees   > Sidelying leg lift- go a little slower, think big vs fast

## 2019-12-11 ENCOUNTER — HOSPITAL ENCOUNTER (OUTPATIENT)
Dept: PHYSICAL THERAPY | Facility: CLINIC | Age: 69
Setting detail: THERAPIES SERIES
End: 2019-12-11
Attending: NURSE PRACTITIONER
Payer: COMMERCIAL

## 2019-12-11 PROCEDURE — 97112 NEUROMUSCULAR REEDUCATION: CPT | Mod: GP | Performed by: PHYSICAL THERAPIST

## 2019-12-11 PROCEDURE — 97110 THERAPEUTIC EXERCISES: CPT | Mod: GP | Performed by: PHYSICAL THERAPIST

## 2019-12-18 ENCOUNTER — HOSPITAL ENCOUNTER (OUTPATIENT)
Dept: PHYSICAL THERAPY | Facility: CLINIC | Age: 69
Setting detail: THERAPIES SERIES
End: 2019-12-18
Attending: NURSE PRACTITIONER
Payer: COMMERCIAL

## 2019-12-18 PROCEDURE — 97110 THERAPEUTIC EXERCISES: CPT | Mod: GP | Performed by: PHYSICAL THERAPIST

## 2019-12-18 PROCEDURE — 97116 GAIT TRAINING THERAPY: CPT | Mod: GP | Performed by: PHYSICAL THERAPIST

## 2019-12-18 PROCEDURE — 97112 NEUROMUSCULAR REEDUCATION: CPT | Mod: GP | Performed by: PHYSICAL THERAPIST

## 2020-02-07 NOTE — PROGRESS NOTES
Outpatient Physical Therapy Discharge Note     Patient: Erlinda Massey  : 1950    Beginning/End Dates of Reporting Period:  10/23/2019 to 2019    Referring Provider: Brigette Schwartz MD    Therapy Diagnosis: Impaired mobility and gait instability     Client Self Report: Very happy about exercises standing without locking R knee, really helpful. [place], but feeling like she doesn't want to be away from her granddaughter for 3+ months.     Objective Measurements:  Objective Measure: 25ft walk  Details: with HFAD: 13.41 sec, 18 steps     Goals:  Goal Identifier Orthotic needs   Goal Description Pt will obtain necessary orthotics to improve the safety and efficiency of gait for improved household and community mobility and reduced falls risk.    Target Date 20   Date Met  19   Progress: Met, has info to obtain HFAD      Goal Identifier 25ft walk   Goal Description Pt will improved 25ft walk pace to 11 sec or better with LR AD and appropriate orthotics, to demonstrate improved gait efficiency.   Target Date 20   Date Met      Progress: Not quite met     Goal Identifier TUG   Goal Description Pt will complete TUG in <13.8 sec with LR AD and appropriate orthotics, to demonstrate reduced falls risk for household mobility.   Target Date 20   Date Met      Progress: Not quite met     Goal Identifier [place] Balance Assessment   Goal Description Pt will achieve >40/56 on [place] Balance Assessment to demonstrate reduced risk for falls.   Target Date 20   Date Met      Progress: Not met     Goal Identifier Balance HEP   Goal Description Pt will demonstrate independence with HEP designed to address functional strength, flexibility, balance and conditioning, particular focus on balance as lacking portion of baseline HEP.    Target Date 20   Date Met  19   Progress: Goal Met       Progress Toward Goals:   Progress this reporting period: Pt with moderate  progress toward goals. More efficient with gait using HFAD and has information to obtain via [facility].    Plan:  Discharge from therapy.    Discharge:    Reason for Discharge: Pt is moving south for the winter.    Equipment Issued: Has order and forms completed to obtain HFAD.    Discharge Plan: Patient to continue home program.

## 2020-11-25 ENCOUNTER — OFFICE VISIT (OUTPATIENT)
Dept: FAMILY MEDICINE | Facility: CLINIC | Age: 70
End: 2020-11-25
Payer: COMMERCIAL

## 2020-11-25 ENCOUNTER — TELEPHONE (OUTPATIENT)
Dept: FAMILY MEDICINE | Facility: CLINIC | Age: 70
End: 2020-11-25

## 2020-11-25 VITALS
SYSTOLIC BLOOD PRESSURE: 132 MMHG | DIASTOLIC BLOOD PRESSURE: 70 MMHG | OXYGEN SATURATION: 97 % | HEIGHT: 65 IN | HEART RATE: 89 BPM | WEIGHT: 181 LBS | RESPIRATION RATE: 16 BRPM | TEMPERATURE: 97.4 F | BODY MASS INDEX: 30.16 KG/M2

## 2020-11-25 DIAGNOSIS — D18.01 HEMANGIOMA OF SKIN: ICD-10-CM

## 2020-11-25 DIAGNOSIS — E55.9 VITAMIN D DEFICIENCY: ICD-10-CM

## 2020-11-25 DIAGNOSIS — Z00.00 ENCOUNTER FOR MEDICARE ANNUAL WELLNESS EXAM: Primary | ICD-10-CM

## 2020-11-25 DIAGNOSIS — E78.1 HYPERTRIGLYCERIDEMIA: ICD-10-CM

## 2020-11-25 DIAGNOSIS — L82.1 SEBORRHEIC KERATOSES: ICD-10-CM

## 2020-11-25 DIAGNOSIS — L81.4 SOLAR LENTIGO: ICD-10-CM

## 2020-11-25 DIAGNOSIS — E78.5 HYPERLIPIDEMIA, UNSPECIFIED HYPERLIPIDEMIA TYPE: ICD-10-CM

## 2020-11-25 DIAGNOSIS — E78.5 HYPERLIPIDEMIA, UNSPECIFIED HYPERLIPIDEMIA TYPE: Primary | ICD-10-CM

## 2020-11-25 DIAGNOSIS — R79.9 ABNORMAL FINDING OF BLOOD CHEMISTRY, UNSPECIFIED: ICD-10-CM

## 2020-11-25 DIAGNOSIS — G35 MS (MULTIPLE SCLEROSIS) (H): ICD-10-CM

## 2020-11-25 DIAGNOSIS — Z28.21 INFLUENZA VACCINE REFUSED: ICD-10-CM

## 2020-11-25 PROCEDURE — 83721 ASSAY OF BLOOD LIPOPROTEIN: CPT | Mod: 59 | Performed by: FAMILY MEDICINE

## 2020-11-25 PROCEDURE — 80061 LIPID PANEL: CPT | Performed by: FAMILY MEDICINE

## 2020-11-25 PROCEDURE — 82306 VITAMIN D 25 HYDROXY: CPT | Performed by: FAMILY MEDICINE

## 2020-11-25 PROCEDURE — 36415 COLL VENOUS BLD VENIPUNCTURE: CPT | Performed by: FAMILY MEDICINE

## 2020-11-25 PROCEDURE — 99397 PER PM REEVAL EST PAT 65+ YR: CPT | Performed by: FAMILY MEDICINE

## 2020-11-25 ASSESSMENT — ACTIVITIES OF DAILY LIVING (ADL): CURRENT_FUNCTION: HOUSEWORK REQUIRES ASSISTANCE

## 2020-11-25 ASSESSMENT — MIFFLIN-ST. JEOR: SCORE: 1346.89

## 2020-11-25 ASSESSMENT — PAIN SCALES - GENERAL: PAINLEVEL: NO PAIN (0)

## 2020-11-25 NOTE — LETTER
November 30, 2020      Erlinda Massey  [address]  [facility] [address]        Dear ,    We are writing to inform you of your test results.    Vitamin D is acceptable at 87.  A bit on the high side, but I believe you said this is where your neurologist recommended.     Resulted Orders   Vitamin D Deficiency   Result Value Ref Range    Vitamin D Deficiency screening 87 (H) 20 - 75 ug/L      Comment:      Season, race, dietary intake, and treatment affect the concentration of   25-hydroxy-Vitamin D. Values may decrease during winter months and increase   during summer months. Values 20-29 ug/L may indicate Vitamin D insufficiency   and values <20 ug/L may indicate Vitamin D deficiency.  Vitamin D determination is routinely performed by an immunoassay specific for   25 hydroxyvitamin D3.  If an individual is on vitamin D2 (ergocalciferol)   supplementation, please specify 25 OH vitamin D2 and D3 level determination by   LCMSMS test [id number].     Lipid panel reflex to direct LDL Non-fasting   Result Value Ref Range    Cholesterol 319 (H) <200 mg/dL      Comment:      Desirable:       <200 mg/dl    Triglycerides 545 (H) <150 mg/dL      Comment:      Borderline high:  150-199 mg/dl  High:             200-499 mg/dl  Very high:       >499 mg/dl      HDL Cholesterol 20 (L) >49 mg/dL    LDL Cholesterol Calculated  <100 mg/dL     Cannot estimate LDL when triglyceride exceeds 400 mg/dL    Non HDL Cholesterol 299 (H) <130 mg/dL      Comment:      Above Desirable:  130-159 mg/dl  Borderline high:  160-189 mg/dl  High:             190-219 mg/dl  Very high:       >219 mg/dl     LDL cholesterol direct   Result Value Ref Range    LDL Cholesterol Direct 202 (H) <100 mg/dL      Comment:      Above desirable:  100-129 mg/dl  Borderline High:  130-159 mg/dL  High:             160-189 mg/dL  Very high:       >189 mg/dl         If you have any questions or concerns, please call the clinic at the number listed  above.       Sincerely,      Margarita Martinez MD

## 2020-11-25 NOTE — PATIENT INSTRUCTIONS
Health Maintenance reviewed and plan for update discussed.  Patient asked to schedule her mammogram         The 10-year ASCVD risk score (Leah ALANIS Jr., et al., 2013) is: 10.4%    Values used to calculate the score:      Age: 70 years      Sex: Female      Is Non- : No      Diabetic: No      Tobacco smoker: No      Systolic Blood Pressure: 132 mmHg      Is BP treated: No      HDL Cholesterol: 56 mg/dL      Total Cholesterol: 309 mg/dL      Our Clinic hours are:  Mondays    7:20 am - 7 pm  Tues -  Fri  7:20 am - 5 pm    Clinic Phone: [phone]    The clinic lab opens at 7:30 am Mon - Fri and appointments are required.    [facility]. [phone]  Monday  8 am - 7pm  Tues - Fri 8 am - 5:30 pm

## 2020-11-25 NOTE — PROGRESS NOTES
"SUBJECTIVE:   Erlinda Massey is a 70 year old female who presents for Preventive Visit.      Patient has been advised of split billing requirements and indicates understanding: Yes   Are you in the first 12 months of your Medicare coverage?  No    Healthy Habits:     In general, how would you rate your overall health?  Excellent    Frequency of exercise:  None (stretch routine due to MS)    Duration of exercise:  Less than 15 minutes    Do you usually eat at least 4 servings of fruit and vegetables a day, include whole grains    & fiber and avoid regularly eating high fat or \"junk\" foods?  Yes    Taking medications regularly:  No    Barriers to taking medications:  None    Ability to successfully perform activities of daily living:  Housework requires assistance    Home Safety:  No safety concerns identified    Hearing Impairment:  No hearing concerns    In the past 6 months, have you been bothered by leaking of urine? Yes    In general, how would you rate your overall mental or emotional health?  Good      PHQ-2 Total Score: 0    Additional concerns today:  No    Do you feel safe in your environment? Yes    Have you ever done Advance Care Planning? (For example, a Health Directive, POLST, or a discussion with a medical provider or your loved ones about your wishes): Yes, advance care planning is on file.      Fall risk  Fallen 2 or more times in the past year?: Yes  Any fall with injury in the past year?: No    Cognitive Screening: declined by patient. Seen neurologist and had a work-up last year.         Mini-CogTM Copyright SHAHRIAR Freire. Licensed by the author for use in [facility]; reprinted with permission (harish@.[email or web address]). All rights reserved.      Do you have sleep apnea, excessive snoring or daytime drowsiness?: no    Reviewed and updated as needed this visit by clinical staff  Tobacco  Allergies  Meds  Problems  Med Hx  Surg Hx  Fam Hx          Reviewed and updated as needed this visit by " "Provider                Social History     Tobacco Use     Smoking status: Never Smoker     Smokeless tobacco: Never Used   Substance Use Topics     Alcohol use: Yes     Comment: occasionally     If you drink alcohol do you typically have >3 drinks per day or >7 drinks per week? No    No flowsheet data found.        Patient Active Problem List    Diagnosis Date Noted     MS (multiple sclerosis) (H) 01/05/2012     Priority: High     Diagnosed in 2011  Right leg - foot drop - AFO has caused more problems with low back so she doesn't use this  Bladder incontinence and frequency - not emptying completely - has seen urology    Has seen Dr. Forrester in [place]  Will be starting with [facility] in [place] 9/2019  -saw an ophthalmologist   -had neuropsych testing       Hyperlipidemia LDL goal <160 08/21/2019     Priority: Medium     Patient refuses statins       Advanced directives, counseling/discussion 11/19/2012     Priority: Medium     Patient does not have an Advance/Health Care Directive (HCD), given \"What is Advance Care Planning?\" flyer.    Erlinda Francis  November 19, 2012         Vitamin D deficiency 11/19/2012     Priority: Medium     Urge urinary incontinence 06/02/2008     Priority: Medium         Current providers sharing in care for this patient include:   Patient Care Team:  Margarita Martinez MD as PCP - General (Family Practice)  Margarita Martinez MD as Assigned PCP    The following health maintenance items are reviewed in Epic and correct as of today:  Health Maintenance   Topic Date Due     HEPATITIS C SCREENING  07/28/1969     Pneumococcal Vaccine: 65+ Years (1 of 1 - PPSV23) 07/28/2016     MAMMO SCREENING  07/11/2020     FALL RISK ASSESSMENT  08/21/2020     INFLUENZA VACCINE (1) 09/01/2020     ZOSTER IMMUNIZATION (1 of 2) 08/21/2024 (Originally 7/28/2001)     MEDICARE ANNUAL WELLNESS VISIT  11/25/2021     COLORECTAL CANCER SCREENING  11/19/2022     LIPID  08/21/2024     ADVANCE CARE PLANNING  " "11/25/2025     DTAP/TDAP/TD IMMUNIZATION (3 - Td) 07/10/2028     PHQ-2  Completed     Pneumococcal Vaccine: Pediatrics (0 to 5 Years) and At-Risk Patients (6 to 64 Years)  Aged Out     IPV IMMUNIZATION  Aged Out     MENINGITIS IMMUNIZATION  Aged Out     HEPATITIS B IMMUNIZATION  Aged Out     Lab work is in process  Pneumonia Vaccine: refused    Review of Systems  Constitutional, HEENT, cardiovascular, pulmonary, gi and gu systems are negative, except as otherwise noted.    OBJECTIVE:   /70   Pulse 89   Temp 97.4  F (36.3  C) (Tympanic)   Resp 16   Ht 1.651 m (5' 5\")   Wt 82.1 kg (181 lb)   LMP 05/14/2007   SpO2 97%   BMI 30.12 kg/m   Estimated body mass index is 30.12 kg/m  as calculated from the following:    Height as of this encounter: 1.651 m (5' 5\").    Weight as of this encounter: 82.1 kg (181 lb).  Physical Exam  GENERAL: healthy, alert and no distress  NECK: no adenopathy, no asymmetry, masses, or scars and thyroid normal to palpation  RESP: lungs clear to auscultation - no rales, rhonchi or wheezes  CV: regular rate and rhythm, normal S1 S2, no S3 or S4, no murmur, click or rub, no peripheral edema and peripheral pulses strong  ABDOMEN: soft, nontender, no hepatosplenomegaly, no masses and bowel sounds normal  MS: no gross musculoskeletal defects noted, no edema    Diagnostic Test Results:  Labs reviewed in Epic    ASSESSMENT / PLAN:   1. Encounter for Medicare annual wellness exam       2. MS (multiple sclerosis) (H)  Has a neurologist in [place] and has seen [facility] here in [place].  On no medications    3. Vitamin D deficiency   goal Vit D is 80-85 per her neurologists recommendation  - Vitamin D Deficiency    4. Hemangioma of skin  Left forearm    5. Seborrheic keratoses  Left upper arm and left knee    6. Solar lentigo  Left thigh    7. Influenza vaccine refused    8.  Hyperlipidemia-  Declines statins.   so risk is high and statin is recommended  Offered CT coronary calcium score but " "she declined and didn't feel it will change her decision making on this.           Patient has been advised of split billing requirements and indicates understanding: Yes  COUNSELING:  Reviewed preventive health counseling, as reflected in patient instructions       Regular exercise       Healthy diet/nutrition    Estimated body mass index is 30.12 kg/m  as calculated from the following:    Height as of this encounter: 1.651 m (5' 5\").    Weight as of this encounter: 82.1 kg (181 lb).        She reports that she has never smoked. She has never used smokeless tobacco.      Appropriate preventive services were discussed with this patient, including applicable screening as appropriate for cardiovascular disease, diabetes, osteopenia/osteoporosis, and glaucoma.  As appropriate for age/gender, discussed screening for colorectal cancer, prostate cancer, breast cancer, and cervical cancer. Checklist reviewing preventive services available has been given to the patient.    Reviewed patients plan of care and provided an AVS. The Basic Care Plan (routine screening as documented in Health Maintenance) for Erlinda meets the Care Plan requirement. This Care Plan has been established and reviewed with the Patient.    Counseling Resources:  ATP IV Guidelines  Pooled Cohorts Equation Calculator  Breast Cancer Risk Calculator  Breast Cancer: Medication to Reduce Risk  FRAX Risk Assessment  ICSI Preventive Guidelines  Dietary Guidelines for Americans, 2010  Wearable Security's MyPlate  ASA Prophylaxis  Lung CA Screening    Margarita Martinez MD  [facility]    Identified Health Risks:  "

## 2020-11-25 NOTE — LETTER
November 30, 2020      Erlinda Massey  [id number]  [facility] [address]        Dear ,    We are writing to inform you of your test results.          The American Heart Association and American College of Cardiology recommends statins for anyone who's 10 year risk exceeds 7.5%, your risk is 13.3%, therefore a statin (cholesterol lowering drug) IS recommended. If you change your mind about this or further screening (CT coronary calcium score), please let me know so I can get that ordered.     The triglycerides are high. Lowering  the amount of sugar, alcohol and sweets in the diet helps to control this .Exercise and weight loss helps.  They are high enough to consider therapy with medicine.  High triglycerides are a risk factor for heart disease and actually indicate that we should check your blood sugar.  Please make a lab only appointment to have a HgbA1c done (this gives us the average blood sugar over three months and looks for diabetes).               The 10-year ASCVD risk score (Leahandie ALANIS Jr., et al., 2013) is: 13.3%     Values used to calculate the score:       Age: 70 years       Sex: Female       Is Non- : No       Diabetic: No       Tobacco smoker: No       Systolic Blood Pressure: 132 mmHg       Is BP treated: No       HDL Cholesterol: 20 mg/dL       Total Cholesterol: 319 mg/dL          Resulted Orders   Vitamin D Deficiency   Result Value Ref Range    Vitamin D Deficiency screening 87 (H) 20 - 75 ug/L      Comment:      Season, race, dietary intake, and treatment affect the concentration of   25-hydroxy-Vitamin D. Values may decrease during winter months and increase   during summer months. Values 20-29 ug/L may indicate Vitamin D insufficiency   and values <20 ug/L may indicate Vitamin D deficiency.  Vitamin D determination is routinely performed by an immunoassay specific for   25 hydroxyvitamin D3.  If an individual is on vitamin D2 (ergocalciferol)    supplementation, please specify 25 OH vitamin D2 and D3 level determination by   LCMSMS test [id number].     Lipid panel reflex to direct LDL Non-fasting   Result Value Ref Range    Cholesterol 319 (H) <200 mg/dL      Comment:      Desirable:       <200 mg/dl    Triglycerides 545 (H) <150 mg/dL      Comment:      Borderline high:  150-199 mg/dl  High:             200-499 mg/dl  Very high:       >499 mg/dl      HDL Cholesterol 20 (L) >49 mg/dL    LDL Cholesterol Calculated  <100 mg/dL     Cannot estimate LDL when triglyceride exceeds 400 mg/dL    Non HDL Cholesterol 299 (H) <130 mg/dL      Comment:      Above Desirable:  130-159 mg/dl  Borderline high:  160-189 mg/dl  High:             190-219 mg/dl  Very high:       >219 mg/dl     LDL cholesterol direct   Result Value Ref Range    LDL Cholesterol Direct 202 (H) <100 mg/dL      Comment:      Above desirable:  100-129 mg/dl  Borderline High:  130-159 mg/dL  High:             160-189 mg/dL  Very high:       >189 mg/dl         If you have any questions or concerns, please call the clinic at the number listed above.       Sincerely,      Margarita Martinez MD

## 2020-11-27 LAB
CHOLEST SERPL-MCNC: 319 MG/DL
DEPRECATED CALCIDIOL+CALCIFEROL SERPL-MC: 87 UG/L (ref 20–75)
HDLC SERPL-MCNC: 20 MG/DL
LDLC SERPL CALC-MCNC: ABNORMAL MG/DL
LDLC SERPL DIRECT ASSAY-MCNC: 202 MG/DL
NONHDLC SERPL-MCNC: 299 MG/DL
TRIGL SERPL-MCNC: 545 MG/DL

## 2020-11-28 NOTE — RESULT ENCOUNTER NOTE
Vitamin D is acceptable at 87.  A bit on the high side, but I believe you said this is where your neurologist recommended.    Margarita Martinez M.D.